# Patient Record
Sex: MALE | Race: WHITE | URBAN - METROPOLITAN AREA
[De-identification: names, ages, dates, MRNs, and addresses within clinical notes are randomized per-mention and may not be internally consistent; named-entity substitution may affect disease eponyms.]

---

## 2022-03-29 ENCOUNTER — OFFICE VISIT (OUTPATIENT)
Dept: PRIMARY CARE CLINIC | Age: 62
End: 2022-03-29
Payer: COMMERCIAL

## 2022-03-29 ENCOUNTER — TELEPHONE (OUTPATIENT)
Dept: PRIMARY CARE CLINIC | Age: 62
End: 2022-03-29

## 2022-03-29 ENCOUNTER — HOSPITAL ENCOUNTER (OUTPATIENT)
Dept: VASCULAR LAB | Age: 62
Discharge: HOME | End: 2022-03-29
Payer: COMMERCIAL

## 2022-03-29 VITALS
SYSTOLIC BLOOD PRESSURE: 106 MMHG | RESPIRATION RATE: 16 BRPM | OXYGEN SATURATION: 99 % | DIASTOLIC BLOOD PRESSURE: 74 MMHG | HEIGHT: 71 IN | HEART RATE: 61 BPM | BODY MASS INDEX: 27.16 KG/M2 | WEIGHT: 194 LBS

## 2022-03-29 DIAGNOSIS — I82.811 ACUTE SUPERFICIAL VENOUS THROMBOSIS OF RIGHT LOWER EXTREMITY: ICD-10-CM

## 2022-03-29 DIAGNOSIS — I87.2 VENOUS INSUFFICIENCY: ICD-10-CM

## 2022-03-29 DIAGNOSIS — M79.89 RIGHT LEG SWELLING: ICD-10-CM

## 2022-03-29 DIAGNOSIS — Z76.89 ENCOUNTER TO ESTABLISH CARE: Primary | ICD-10-CM

## 2022-03-29 DIAGNOSIS — Z12.11 COLON CANCER SCREENING: ICD-10-CM

## 2022-03-29 PROCEDURE — 99203 OFFICE O/P NEW LOW 30 MIN: CPT | Performed by: INTERNAL MEDICINE

## 2022-03-29 PROCEDURE — 93971 EXTREMITY STUDY: CPT

## 2022-03-29 ASSESSMENT — ENCOUNTER SYMPTOMS
BLOOD IN STOOL: 0
SINUS PRESSURE: 0
CHEST TIGHTNESS: 0
ABDOMINAL PAIN: 0
EYE PAIN: 0
BACK PAIN: 0
CONSTIPATION: 0
WHEEZING: 0
NAUSEA: 0
DIARRHEA: 0
VOMITING: 0
COUGH: 0
SHORTNESS OF BREATH: 0
SORE THROAT: 0
ABDOMINAL DISTENTION: 0
COLOR CHANGE: 0
EYE REDNESS: 0
TROUBLE SWALLOWING: 0

## 2022-03-29 NOTE — ASSESSMENT & PLAN NOTE
Patient comes in to establish care,  We discussed age appropriate USPSTF screens  Over 75% of the visit was spent counselling patient on appropriate lifestyle choices.    See below for other diagnoses

## 2022-03-29 NOTE — PROGRESS NOTES
Chief Complaint -     Nicole Addison is 58 y.o. male with a PMH of right leg venous insufficiency and SVT who p/w Leg Swelling and New Patient  Patient mentions he has a history of right leg SVT which was treated with NSAIDs and compression. Patient complaining of swelling and pain to the calf area for the last couple of days. Patient said swelling was tense  Resulting in pain but  it actually feels better today. Patient denies any cellulitic changes, denies any stigmata of pulmonary embolus shortness of breath, pleuritic pain, hemoptysis, dizziness presyncope. The problem and medicine lists and chart were reviewed in detail. Review of Systems   Constitutional: Negative for activity change, appetite change, chills, fatigue, fever and unexpected weight change. HENT: Negative for congestion, ear pain, postnasal drip, sinus pressure, sore throat, tinnitus and trouble swallowing. Eyes: Negative for pain and redness. Respiratory: Negative for cough, chest tightness, shortness of breath and wheezing. Cardiovascular: Positive for leg swelling. Negative for chest pain and palpitations. Gastrointestinal: Negative for abdominal distention, abdominal pain, blood in stool, constipation, diarrhea, nausea and vomiting. Endocrine: Negative for cold intolerance, heat intolerance and polydipsia. Genitourinary: Negative for decreased urine volume, dysuria, flank pain, frequency, hematuria and urgency. Musculoskeletal: Negative for arthralgias, back pain, joint swelling, neck pain and neck stiffness. Skin: Negative for color change and rash. Neurological: Negative for dizziness, weakness, numbness and headaches. Hematological: Negative for adenopathy. Psychiatric/Behavioral: Negative for behavioral problems, sleep disturbance and suicidal ideas. The patient is not nervous/anxious.          /74 (Site: Left Upper Arm, Position: Sitting, Cuff Size: Large Adult)   Pulse 61   Resp 16   Ht 5' 11\" (1.803 m)   Wt 194 lb (88 kg)   SpO2 99%   BMI 27.06 kg/m²    Physical Exam      Patient in no acute respiratory distress or painful distress. Normal appearance, not ill looking    Normocephalic, PERRL, EOM movements intact, no nystagmus    Mucous membrnes moist and pink. No scleral icterus    Vesicular BS, no wheeze ronchi, crackles or other adventitious sounds    S1S2 regular rate and rhythm, no murmur gallops or rubs    Edema ++ right leg,  POS varicosities and spider veins seen. Alert and Oriented to time, place and person. No focal signs or neurological deficits. Muscle power grade 5/5 in major muscle groups of all 4 limbs. Cranial nerves III-XII intact and WNL. ASSESSMENT/PLAN:  1. Encounter to establish care  Assessment & Plan:  Patient comes in to establish care,  We discussed age appropriate USPSTF screens  Over 75% of the visit was spent counselling patient on appropriate lifestyle choices. See below for other diagnoses     2. Acute superficial venous thrombosis of right lower extremity  Assessment & Plan:  Venous insufficiency right lower leg, patient has varicose veins and spider veins. History of SVT. Now having swelling discomfort to right leg. Had Doppler today which showed recurrence of his SVT  Patient will manage of NSAID  We will discuss referral to vascular doctor when patient returns for physical  3. Venous insufficiency  Assessment & Plan:  Venous insufficiency right lower leg, patient has varicose veins and spider veins. History of SVT. Now having swelling discomfort to right leg. We will get imaging  4. Right leg swelling  Assessment & Plan:  Venous insufficiency right lower leg, patient has varicose veins and spider veins. History of SVT. Now having swelling discomfort to right leg.   Had Doppler today which showed recurrence of his SVT  Patient will manage of NSAID  We will discuss referral to vascular doctor when patient returns for physical    Orders:  -     VL DUP LOWER EXTREMITY VENOUS RIGHT; Future  5. Colon cancer screening  -     Dafne Hdez MD, Gastroenterology, [facility]      Return in about 2 weeks (around 4/12/2022). This note was generated in part or in whole with voice recognition software. Voice recognition is usually quite accurate but there are transcription errors that can often occur. All attempts were made to correct these errors I apologized for any  typographical errors that were not detected and corrected. Electronically signed by Winston Umana.  Esha Mensah MD on 3/29/2022 at 6:43 PM.

## 2022-03-29 NOTE — ASSESSMENT & PLAN NOTE
Venous insufficiency right lower leg, patient has varicose veins and spider veins. History of SVT. Now having swelling discomfort to right leg.   Had Doppler today which showed recurrence of his SVT  Patient will manage of NSAID  We will discuss referral to vascular doctor when patient returns for physical

## 2022-03-29 NOTE — TELEPHONE ENCOUNTER
Please call Lida Dacosta at [facility] with results for Chilo's labs. She will be there for 20 minutes.

## 2022-03-29 NOTE — ASSESSMENT & PLAN NOTE
Venous insufficiency right lower leg, patient has varicose veins and spider veins. History of SVT. Now having swelling discomfort to right leg.   We will get imaging

## 2022-04-11 ASSESSMENT — PATIENT HEALTH QUESTIONNAIRE - PHQ9
SUM OF ALL RESPONSES TO PHQ QUESTIONS 1-9: 0
2. FEELING DOWN, DEPRESSED OR HOPELESS: 0
SUM OF ALL RESPONSES TO PHQ9 QUESTIONS 1 & 2: 0
SUM OF ALL RESPONSES TO PHQ QUESTIONS 1-9: 0
1. LITTLE INTEREST OR PLEASURE IN DOING THINGS: 0